# Patient Record
Sex: FEMALE | Race: WHITE | NOT HISPANIC OR LATINO | Employment: STUDENT | URBAN - METROPOLITAN AREA
[De-identification: names, ages, dates, MRNs, and addresses within clinical notes are randomized per-mention and may not be internally consistent; named-entity substitution may affect disease eponyms.]

---
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
## 2022-12-19 ENCOUNTER — OFFICE VISIT (OUTPATIENT)
Dept: OBSTETRICS AND GYNECOLOGY | Facility: CLINIC | Age: 22
End: 2022-12-19

## 2022-12-19 VITALS
BODY MASS INDEX: 23.39 KG/M2 | DIASTOLIC BLOOD PRESSURE: 62 MMHG | HEIGHT: 63 IN | SYSTOLIC BLOOD PRESSURE: 110 MMHG | WEIGHT: 132 LBS

## 2022-12-19 DIAGNOSIS — Z30.011 VISIT FOR ORAL CONTRACEPTIVE PRESCRIPTION: ICD-10-CM

## 2022-12-19 DIAGNOSIS — N91.5 OLIGOMENORRHEA, UNSPECIFIED TYPE: Primary | ICD-10-CM

## 2022-12-19 DIAGNOSIS — Z76.89 ESTABLISHING CARE WITH NEW DOCTOR, ENCOUNTER FOR: ICD-10-CM

## 2022-12-19 DIAGNOSIS — E28.2 PCOS (POLYCYSTIC OVARIAN SYNDROME): ICD-10-CM

## 2022-12-19 PROCEDURE — 99204 OFFICE O/P NEW MOD 45 MIN: CPT | Performed by: OBSTETRICS & GYNECOLOGY

## 2022-12-19 RX ORDER — NORGESTIMATE AND ETHINYL ESTRADIOL 0.25-0.035
1 KIT ORAL DAILY
Qty: 84 EACH | Refills: 3 | Status: SHIPPED | OUTPATIENT
Start: 2022-12-19

## 2022-12-19 NOTE — PROGRESS NOTES
New GYN Exam    CC- Here for possible PCOS and to establish care, start OCPS    Joi Fong is a 22 y.o. female new patient who presents to establish care and to discuss PCOS. She has always had irregular cycles and may skip 6 months of her periods. 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 Since then, she has had regular cycles and is doing well, but she needs a new RX for a pill that is available in the [place] and wants to confirm her PCOS diagnosis. She is getting ready to go back to college in [place] and has to do her workup today. Her US shows a 5.7 cm AV uteris with an EL = 0.5 cm and her ovaries appear polycystic. There is cul de sac fluid that measures 2.5 x 0.8 cm. She is not SA and has no plans. Her mom had a double uterus.       OB History        0    Para   0    Term   0       0    AB   0    Living   0       SAB   0    IAB   0    Ectopic   0    Molar   0    Multiple   0    Live Births   0          Obstetric Comments   No plans             Menarche: 15  Current contraception: abstinence and OCP (estrogen/progesterone)  History of abnormal Pap smear: never had one  History of abnormal mammogram: never had one  Family history of uterine, colon or ovarian cancer: no  Family history of breast cancer: no  H/o STDs: none  Last pap:never  Gardasil:completed  ALISSON: none   Virginal   ? PCOS    Health Maintenance   Topic Date Due   • HPV VACCINES (1 - 2-dose series) Never done   • HEPATITIS C SCREENING  Never done   • ANNUAL PHYSICAL  Never done   • TDAP/TD VACCINES (1 - Tdap) Never done   • COVID-19 Vaccine (3 - Booster for Pfizer series) 2021   • INFLUENZA VACCINE  Never done   • MENINGOCOCCAL VACCINE  Completed   • Pneumococcal Vaccine 0-64  Aged Out       Past Medical History:   Diagnosis Date   • Polycystic ovary syndrome        Past Surgical History:   Procedure Laterality Date   •  "EYE SURGERY      strabismus?   • TONSILLECTOMY     • WISDOM TOOTH EXTRACTION           Current Outpatient Medications:   •  norgestimate-ethinyl estradiol (ORTHO-CYCLEN) 0.25-35 MG-MCG per tablet, Take 1 tablet by mouth Daily., Disp: 84 each, Rfl: 3    No Known Allergies    Social History     Tobacco Use   • Smoking status: Never   • Smokeless tobacco: Never   Vaping Use   • Vaping Use: Never used   Substance Use Topics   • Alcohol use: No   • Drug use: No       Family History   Problem Relation Age of Onset   • Breast cancer Neg Hx    • Uterine cancer Neg Hx    • Ovarian cancer Neg Hx    • Colon cancer Neg Hx    • Deep vein thrombosis Neg Hx    • Pulmonary embolism Neg Hx        Review of Systems   Constitutional: Positive for activity change.   Genitourinary: Positive for menstrual problem.       /62   Ht 160 cm (63\")   Wt 59.9 kg (132 lb)   LMP 12/19/2022   Breastfeeding No   BMI 23.38 kg/m²     Physical Exam  Vitals and nursing note reviewed.   Constitutional:       Appearance: Normal appearance. She is well-developed.   HENT:      Head: Normocephalic and atraumatic.   Eyes:      General: No scleral icterus.     Conjunctiva/sclera: Conjunctivae normal.   Neck:      Thyroid: No thyromegaly.   Cardiovascular:      Rate and Rhythm: Normal rate and regular rhythm.   Pulmonary:      Effort: Pulmonary effort is normal.      Breath sounds: Normal breath sounds.   Abdominal:      General: There is no distension.      Palpations: Abdomen is soft. There is no mass.      Tenderness: There is no abdominal tenderness. There is no guarding or rebound.      Hernia: No hernia is present.   Skin:     General: Skin is warm and dry.   Neurological:      Mental Status: She is alert and oriented to person, place, and time.   Psychiatric:         Mood and Affect: Mood normal.         Behavior: Behavior normal.         Thought Content: Thought content normal.         Judgment: Judgment normal.             "   Assessment/Plan  1) PCOS- Discussed with patient the symptoms of PCOS which include irregular periods, difficulty controlling or maintaining weight (80% of women with PCOS are overweight), acne, unwanted hair growth, patches of velvety darkened skin called acanthuses nigrans, and multiple small cysts or follicles on the ovaries.  This diagnosis is made through taking a patient’s history and obtaining fasting lab work and a pelvic ultrasound.  If a patient meets two out of three criteria for PCOS, a diagnosis is made.     PCOS is common, with 1 out of 10 women meeting criteria for the disorder.  The exact cause of PCOS is unknown and is probably the result of many factors working together.  One of these factors is elevated insulin resistance.  IR is a condition where the body’s cells do not respond to insulin as they should and it takes MORE insulin to move the sugar into the cells of the body.  So, patients with PCOS have elevated levels of fasting insulin, independent of what they eat.  Over time, IR can lead to diabetes.  One of the treatments for PCOS is metformin, which helps with insulin regulation.  Weight loss, even 10-15 pounds, can also help with insulin regulation and help normalize periods.  Depending on a patient’s desire for pregnancy, we also use birth control pills to help regulate cycles, decrease levels of male hormones called androgens that can lead to hair growth and acne, as well as decrease risks of uterine cancer.  For those patients wanting pregnancy now, we recommend weight loss and medications to help stimulate the ovaries to ovulate.  Part of the benefit of diagnosing a patient at a young age with PCOS is that we can help her have good control of her insulin levels, bleeding and weight so that ovulation is more likely even without medication.  She has abnormal cycles off OCPS and polycytic ovaries on US, so she has the diagnosis. We will check PCOS labs and HgBA1c today and call her with  the results.   2) GYN HM: plan age 23  SBE demonstrated and encouraged.  3) STD screening: declines Condoms encouraged once SA.  4) Contraception: ERX orthocyclen. Discussed with patient correct usage of oral contraceptive pills/patches/rings and what to do for a missed dose.  Patient reminded that condoms are the only form of contraceptive that can also prevent STDs and so use is encouraged with every act of coitus.  We reviewed ACHES warning signs (abdominal pain, chest pain, headache, eye vision changes or severe leg pain and or swelling).  Patient is encouraged to call for any questions or concerns.    5) Family Planning: family planning: no plans at present , encourage folic acid daily  6) Diet and Exercise discussed  7) Smoking Status: No  8) Social: in college in [place]  9) MMG- plan age 42  10)I saw the patient with a face mask, gloves and eye protection  The patient herself was masked.  Social distancing was observed as appropriate.  11)Follow up this summer once home from school.   12) Time Spent: I spent > 45 minutes caring for Joi on this date of service. This time includes time spent by me in the following activities: preparing for the visit, reviewing tests, obtaining and/or reviewing a separately obtained history, performing a medically appropriate examination and/or evaluation, counseling and educating the patient/family/caregiver, ordering medications, tests, or procedures, documenting information in the medical record, independently interpreting results and communicating that information with the patient/family/caregiver and care coordination.          Diagnoses and all orders for this visit:    1. Oligomenorrhea, unspecified type (Primary)  -     17-Hydroxyprogesterone  -     DHEA-Sulfate  -     Estradiol  -     Follicle Stimulating Hormone  -     Glucose, Plasma ([facility])  -     Insulin, Total  -     Prolactin  -     Testosterone Free Direct  -     TSH Rfx On Abnormal To Free T4    2. Visit for  oral contraceptive prescription    3. PCOS (polycystic ovarian syndrome)    4. Establishing care with new doctor, encounter for    Other orders  -     norgestimate-ethinyl estradiol (ORTHO-CYCLEN) 0.25-35 MG-MCG per tablet; Take 1 tablet by mouth Daily.  Dispense: 84 each; Refill: 3          Kiarra Pérez MD  12/19/2022  15:13 EST

## 2022-12-21 LAB
17OHP SERPL-MCNC: 83 NG/DL
DHEA-S SERPL-MCNC: 255 UG/DL (ref 110–431.7)
ESTRADIOL SERPL-MCNC: 19 PG/ML
FSH SERPL-ACNC: 5.4 MIU/ML
GLUCOSE P FAST SERPL-MCNC: 82 MG/DL (ref 65–99)
INSULIN SERPL-ACNC: 14.4 UIU/ML (ref 2.6–24.9)
PROLACTIN SERPL-MCNC: 11.8 NG/ML (ref 4.8–23.3)
TESTOST FREE SERPL-MCNC: 0.5 PG/ML (ref 0–4.2)
TSH SERPL DL<=0.005 MIU/L-ACNC: 0.91 UIU/ML (ref 0.27–4.2)

## 2022-12-27 NOTE — PROGRESS NOTES
PIP= PCOS lab work is all normal, but we know  she still has the diagnosis of PCOS as she has irregular cycles and polycystic ovaries on ultrasound.

## 2023-11-09 ENCOUNTER — TELEPHONE (OUTPATIENT)
Dept: OBSTETRICS AND GYNECOLOGY | Facility: CLINIC | Age: 23
End: 2023-11-09
Payer: COMMERCIAL

## 2023-11-09 RX ORDER — NORGESTIMATE AND ETHINYL ESTRADIOL 0.25-0.035
1 KIT ORAL DAILY
Qty: 84 EACH | Refills: 3 | Status: CANCELLED | OUTPATIENT
Start: 2023-11-09